# Patient Record
Sex: MALE | Race: WHITE | NOT HISPANIC OR LATINO | Employment: OTHER | URBAN - NONMETROPOLITAN AREA
[De-identification: names, ages, dates, MRNs, and addresses within clinical notes are randomized per-mention and may not be internally consistent; named-entity substitution may affect disease eponyms.]

---

## 2017-09-22 ENCOUNTER — OFFICE VISIT (OUTPATIENT)
Dept: UROLOGY | Facility: CLINIC | Age: 77
End: 2017-09-22

## 2017-09-22 VITALS
HEIGHT: 60 IN | WEIGHT: 206 LBS | HEART RATE: 80 BPM | SYSTOLIC BLOOD PRESSURE: 141 MMHG | DIASTOLIC BLOOD PRESSURE: 84 MMHG | BODY MASS INDEX: 40.44 KG/M2

## 2017-09-22 DIAGNOSIS — R32 INCONTINENCE: Primary | ICD-10-CM

## 2017-09-22 DIAGNOSIS — N40.0 BENIGN NON-NODULAR PROSTATIC HYPERPLASIA, PRESENCE OF LOWER URINARY TRACT SYMPTOMS UNSPECIFIED: ICD-10-CM

## 2017-09-22 LAB
BILIRUB BLD-MCNC: NEGATIVE MG/DL
CLARITY, POC: CLEAR
COLOR UR: YELLOW
GLUCOSE UR STRIP-MCNC: NEGATIVE MG/DL
KETONES UR QL: ABNORMAL
LEUKOCYTE EST, POC: NEGATIVE
NITRITE UR-MCNC: NEGATIVE MG/ML
PH UR: 5 [PH] (ref 5–8)
PROT UR STRIP-MCNC: ABNORMAL MG/DL
PSA SERPL-MCNC: 0.67 NG/ML (ref 0–4)
RBC # UR STRIP: NEGATIVE /UL
SP GR UR: 1.02 (ref 1–1.03)
UROBILINOGEN UR QL: NORMAL

## 2017-09-22 PROCEDURE — 51798 US URINE CAPACITY MEASURE: CPT | Performed by: NURSE PRACTITIONER

## 2017-09-22 PROCEDURE — 84153 ASSAY OF PSA TOTAL: CPT | Performed by: NURSE PRACTITIONER

## 2017-09-22 PROCEDURE — 36415 COLL VENOUS BLD VENIPUNCTURE: CPT | Performed by: NURSE PRACTITIONER

## 2017-09-22 PROCEDURE — 99203 OFFICE O/P NEW LOW 30 MIN: CPT | Performed by: NURSE PRACTITIONER

## 2017-09-22 PROCEDURE — 81003 URINALYSIS AUTO W/O SCOPE: CPT | Performed by: NURSE PRACTITIONER

## 2017-09-22 RX ORDER — LORATADINE 10 MG/1
10 TABLET, ORALLY DISINTEGRATING ORAL DAILY
COMMUNITY

## 2017-09-22 RX ORDER — ATORVASTATIN CALCIUM 20 MG/1
TABLET, FILM COATED ORAL
COMMUNITY
Start: 2017-09-08

## 2017-09-22 RX ORDER — TRAZODONE HYDROCHLORIDE 100 MG/1
100 TABLET ORAL NIGHTLY
COMMUNITY

## 2017-09-22 RX ORDER — MELOXICAM 7.5 MG/1
TABLET ORAL
COMMUNITY
Start: 2017-09-15

## 2017-09-22 RX ORDER — PANTOPRAZOLE SODIUM 40 MG/1
TABLET, DELAYED RELEASE ORAL
COMMUNITY
Start: 2017-09-18

## 2017-09-22 RX ORDER — MEGESTROL ACETATE 40 MG/ML
SUSPENSION ORAL
COMMUNITY
Start: 2017-07-03

## 2017-09-22 RX ORDER — ACETAMINOPHEN 500 MG
500 TABLET ORAL EVERY 6 HOURS PRN
COMMUNITY

## 2017-09-22 RX ORDER — FERROUS SULFATE 325(65) MG
325 TABLET ORAL
COMMUNITY

## 2017-09-22 RX ORDER — MIRTAZAPINE 15 MG/1
TABLET, FILM COATED ORAL
COMMUNITY
Start: 2017-09-13

## 2017-09-22 RX ORDER — ESCITALOPRAM OXALATE 20 MG/1
TABLET ORAL
COMMUNITY
Start: 2017-09-05

## 2017-09-22 RX ORDER — CLONIDINE HYDROCHLORIDE 0.1 MG/1
0.1 TABLET ORAL 2 TIMES DAILY
COMMUNITY

## 2017-09-22 RX ORDER — TAMSULOSIN HYDROCHLORIDE 0.4 MG/1
CAPSULE ORAL
COMMUNITY
Start: 2017-09-10 | End: 2018-03-29 | Stop reason: SDUPTHER

## 2017-09-22 RX ORDER — BISACODYL 5 MG/1
5 TABLET, DELAYED RELEASE ORAL DAILY PRN
COMMUNITY

## 2017-09-22 RX ORDER — FINASTERIDE 5 MG/1
TABLET, FILM COATED ORAL
COMMUNITY
Start: 2017-08-28 | End: 2018-03-29 | Stop reason: SDUPTHER

## 2017-09-22 RX ORDER — POLYETHYLENE GLYCOL 3350 17 G/17G
POWDER, FOR SOLUTION ORAL
COMMUNITY
Start: 2017-08-28

## 2017-09-22 RX ORDER — HYDROCODONE BITARTRATE AND ACETAMINOPHEN 7.5; 325 MG/1; MG/1
TABLET ORAL
COMMUNITY
Start: 2017-09-06

## 2017-09-22 RX ORDER — FENTANYL 75 UG/H
PATCH TRANSDERMAL
COMMUNITY
Start: 2017-08-28

## 2017-09-22 RX ORDER — MEMANTINE HYDROCHLORIDE AND DONEPEZIL HYDROCHLORIDE 28; 10 MG/1; MG/1
CAPSULE ORAL
COMMUNITY
Start: 2017-09-12

## 2017-09-22 RX ORDER — LEVOTHYROXINE SODIUM 0.07 MG/1
TABLET ORAL
COMMUNITY
Start: 2017-09-21

## 2017-09-22 NOTE — PROGRESS NOTES
Chief Complaint:          Chief Complaint   Patient presents with   • Urinary Incontinence       HPI:   77 y.o. male being seen in the office today for history of urinary incontinence. Patient is resting of the local nursing home. He is a status post hip fracture with repair. His main urinary incontinence is during the night but he does use a urinal to urinate. Patient is able to stand and ambulate. His postvoid residual today is 30 mL. He is currently on Flomax and finasteride as maximum medical therapy for BPH. His urinalysis today reveals negative results.    HPI        Past Medical History:        Past Medical History:   Diagnosis Date   • Allergic rhinitis    • Alzheimer disease    • Anxiety    • BPH (benign prostatic hyperplasia)    • Broken hip    • Dementia    • Depression    • Diabetes mellitus    • Hyperlipemia    • Hypothyroid    • Insomnia    • Muscle weakness    • Osteoporosis          Current Meds:     Current Outpatient Prescriptions   Medication Sig Dispense Refill   • acetaminophen (TYLENOL) 500 MG tablet Take 500 mg by mouth Every 6 (Six) Hours As Needed for Mild Pain .     • atorvastatin (LIPITOR) 20 MG tablet      • bisacodyl (DULCOLAX) 5 MG EC tablet Take 5 mg by mouth Daily As Needed for Constipation.     • CloNIDine (CATAPRES) 0.1 MG tablet Take 0.1 mg by mouth 2 (Two) Times a Day.     • escitalopram (LEXAPRO) 20 MG tablet      • fentaNYL (DURAGESIC) 75 MCG/HR patch      • ferrous sulfate 325 (65 FE) MG tablet Take 325 mg by mouth Daily With Breakfast.     • finasteride (PROSCAR) 5 MG tablet      • HYDROcodone-acetaminophen (NORCO) 7.5-325 MG per tablet      • levothyroxine (SYNTHROID, LEVOTHROID) 75 MCG tablet      • loratadine (LORATADINE ALLERGY RELIEF) 10 MG disintegrating tablet Take 10 mg by mouth Daily.     • megestrol (MEGACE) 40 MG/ML suspension      • meloxicam (MOBIC) 7.5 MG tablet      • metFORMIN (GLUCOPHAGE) 1000 MG tablet      • mirtazapine (REMERON) 15 MG tablet      • NAMZARIC  28-10 MG capsule sustained-release 24 hr      • pantoprazole (PROTONIX) 40 MG EC tablet      • polyethylene glycol (MIRALAX) powder      • tamsulosin (FLOMAX) 0.4 MG capsule 24 hr capsule      • traZODone (DESYREL) 100 MG tablet Take 100 mg by mouth Every Night.       No current facility-administered medications for this visit.         Allergies:      No Known Allergies     Past Surgical History:     History reviewed. No pertinent surgical history.      Social History:     Social History     Social History   • Marital status: Unknown     Spouse name: N/A   • Number of children: N/A   • Years of education: N/A     Occupational History   • Not on file.     Social History Main Topics   • Smoking status: Former Smoker   • Smokeless tobacco: Never Used   • Alcohol use No   • Drug use: No   • Sexual activity: Not on file     Other Topics Concern   • Not on file     Social History Narrative   • No narrative on file       Family History:     History reviewed. No pertinent family history.    Review of Systems:     Review of Systems   Constitutional: Negative for chills, fatigue and fever.   Respiratory: Negative for cough, shortness of breath and wheezing.    Cardiovascular: Negative for leg swelling.   Gastrointestinal: Negative for abdominal pain and nausea.   Musculoskeletal: Positive for back pain. Negative for joint swelling.   Neurological: Negative for dizziness and headaches.   Psychiatric/Behavioral: Negative for confusion.       Physical Exam:     Physical Exam   Constitutional: He is oriented to person, place, and time. He appears well-developed and well-nourished. No distress.   Abdominal: Soft. Bowel sounds are normal. He exhibits no distension and no mass. There is no tenderness. There is no rebound and no guarding. No hernia.   Genitourinary: Rectum normal, prostate normal and penis normal.   Musculoskeletal: Normal range of motion.   Neurological: He is alert and oriented to person, place, and time.   Skin:  Skin is warm and dry. No rash noted. He is not diaphoretic. No pallor.   Psychiatric: He has a normal mood and affect. His behavior is normal. Judgment and thought content normal.   Nursing note and vitals reviewed.      Procedure:     No notes on file      Assessment:     Encounter Diagnoses   Name Primary?   • Incontinence Yes   • Benign non-nodular prostatic hyperplasia, presence of lower urinary tract symptoms unspecified      Orders Placed This Encounter   Procedures   • PSA   • Bladder Scan   • POC Urinalysis Dipstick, Automated       Plan:   PSA to be drawn today since he has not had one drawn in over one year. Recommend the patient be assisted with standing or going to the toilet to urinate to help facilitate emptying of the bladder or the use of a bedside commode. He is to continue the Flomax and Proscar as currently prescribed. Further recommend he start PT to help better his ambulation as this can also help with decrease in the urinary difficulties. He is returned to the office in 6 months for follow-up for a bladder scan.    Counseling was given to patient and caretaker for the following topics diagnostic results, patient and family education and impressions. and the interim medical history and current results were reviewed.  A treatment plan with follow-up was made. Total time of the encounter was 32 minutes and 32 minutes were spent discussing Incontinence [R32] face-to-face.       This document has been electronically signed by JORDAN Fisher September 22, 2017 6:58 PM

## 2018-03-29 ENCOUNTER — OFFICE VISIT (OUTPATIENT)
Dept: UROLOGY | Facility: CLINIC | Age: 78
End: 2018-03-29

## 2018-03-29 DIAGNOSIS — N40.1 BENIGN PROSTATIC HYPERPLASIA WITH URINARY OBSTRUCTION: ICD-10-CM

## 2018-03-29 DIAGNOSIS — N39.3 STRESS INCONTINENCE OF URINE: Primary | ICD-10-CM

## 2018-03-29 DIAGNOSIS — N13.8 BENIGN PROSTATIC HYPERPLASIA WITH URINARY OBSTRUCTION: ICD-10-CM

## 2018-03-29 PROBLEM — R32 URINARY INCONTINENCE: Status: ACTIVE | Noted: 2018-03-29

## 2018-03-29 PROCEDURE — 51798 US URINE CAPACITY MEASURE: CPT | Performed by: NURSE PRACTITIONER

## 2018-03-29 PROCEDURE — 99213 OFFICE O/P EST LOW 20 MIN: CPT | Performed by: NURSE PRACTITIONER

## 2018-03-29 RX ORDER — TAMSULOSIN HYDROCHLORIDE 0.4 MG/1
1 CAPSULE ORAL DAILY
Qty: 30 CAPSULE | Refills: 11 | Status: SHIPPED | OUTPATIENT
Start: 2018-03-29 | End: 2019-03-29 | Stop reason: SDUPTHER

## 2018-03-29 RX ORDER — FINASTERIDE 5 MG/1
5 TABLET, FILM COATED ORAL DAILY
Qty: 30 TABLET | Refills: 11 | Status: SHIPPED | OUTPATIENT
Start: 2018-03-29 | End: 2019-03-29 | Stop reason: SDUPTHER

## 2018-03-29 NOTE — PROGRESS NOTES
Chief Complaint:          Chief Complaint   Patient presents with   • Urinary Incontinence       HPI:   77 y.o. male resident of the [facility] being seen in the office today for history of urinary incontinence. Patient is currently on Flomax and Proscar as maximum medical therapy for BPH. His current PSA drawn on 09/20/2017 is 0.67 and his PVR today was 79 mL. Denies any discomfort with urination. Does use a urinal to urinate but is able to walk to the bathroom    HPI        Past Medical History:        Past Medical History:   Diagnosis Date   • Allergic rhinitis    • Alzheimer disease    • Anxiety    • BPH (benign prostatic hyperplasia)    • Broken hip    • Dementia    • Depression    • Diabetes mellitus    • Hyperlipemia    • Hypothyroid    • Insomnia    • Muscle weakness    • Osteoporosis          Current Meds:     Current Outpatient Prescriptions   Medication Sig Dispense Refill   • acetaminophen (TYLENOL) 500 MG tablet Take 500 mg by mouth Every 6 (Six) Hours As Needed for Mild Pain .     • atorvastatin (LIPITOR) 20 MG tablet      • bisacodyl (DULCOLAX) 5 MG EC tablet Take 5 mg by mouth Daily As Needed for Constipation.     • CloNIDine (CATAPRES) 0.1 MG tablet Take 0.1 mg by mouth 2 (Two) Times a Day.     • escitalopram (LEXAPRO) 20 MG tablet      • fentaNYL (DURAGESIC) 75 MCG/HR patch      • ferrous sulfate 325 (65 FE) MG tablet Take 325 mg by mouth Daily With Breakfast.     • finasteride (PROSCAR) 5 MG tablet Take 1 tablet by mouth Daily. 30 tablet 11   • HYDROcodone-acetaminophen (NORCO) 7.5-325 MG per tablet      • levothyroxine (SYNTHROID, LEVOTHROID) 75 MCG tablet      • loratadine (LORATADINE ALLERGY RELIEF) 10 MG disintegrating tablet Take 10 mg by mouth Daily.     • megestrol (MEGACE) 40 MG/ML suspension      • meloxicam (MOBIC) 7.5 MG tablet      • metFORMIN (GLUCOPHAGE) 1000 MG tablet      • mirtazapine (REMERON) 15 MG tablet      • NAMZARIC 28-10 MG capsule sustained-release 24 hr       • pantoprazole (PROTONIX) 40 MG EC tablet      • polyethylene glycol (MIRALAX) powder      • tamsulosin (FLOMAX) 0.4 MG capsule 24 hr capsule Take 1 capsule by mouth Daily. 30 capsule 11   • traZODone (DESYREL) 100 MG tablet Take 100 mg by mouth Every Night.       No current facility-administered medications for this visit.         Allergies:      No Known Allergies     Past Surgical History:     No past surgical history on file.      Social History:     Social History     Social History   • Marital status: Unknown     Spouse name: N/A   • Number of children: N/A   • Years of education: N/A     Occupational History   • Not on file.     Social History Main Topics   • Smoking status: Former Smoker   • Smokeless tobacco: Never Used   • Alcohol use No   • Drug use: No   • Sexual activity: Not on file     Other Topics Concern   • Not on file     Social History Narrative   • No narrative on file       Family History:     No family history on file.    Review of Systems:     Review of Systems   Constitutional: Negative for activity change.   HENT: Negative.    Respiratory: Negative.    Gastrointestinal: Negative.    Genitourinary: Positive for frequency.        Incomplete bladder emptying   Musculoskeletal: Positive for back pain.   Neurological: Negative.    Psychiatric/Behavioral: Negative.          I have reviewed the following portions of the patient's history: allergies, current medications, past family history, past medical history, past social history, past surgical history, problem list and ROS and confirm it's accurate.  Physical Exam:     Physical Exam   Constitutional: He is oriented to person, place, and time. He appears well-developed and well-nourished. No distress.   Abdominal: Soft. Bowel sounds are normal. He exhibits no distension and no mass. There is no tenderness. There is no rebound and no guarding. No hernia.   Musculoskeletal: Normal range of motion.   Neurological: He is alert and oriented to  person, place, and time.   Skin: Skin is warm and dry. No rash noted. He is not diaphoretic. No pallor.   Psychiatric: He has a normal mood and affect. His behavior is normal. Judgment and thought content normal.   Nursing note and vitals reviewed.      Procedure:     No notes on file      Assessment:     Encounter Diagnoses   Name Primary?   • Stress incontinence of urine Yes   • Benign prostatic hyperplasia with urinary obstruction      Orders Placed This Encounter   Procedures   • Bladder Scan       Plan:   Since the patient is doing well on Flomax and Proscar and his PVR being only 79 mL will recommend we continue the medication as currently prescribed. He is to return to the office in one year for medical management with a PSA prior to his appointment at which time a MARIE and a PVR will be completed.      Counseling was given to patient and caretaker for the following topics patient and family education including: As discussed in the plan above. The interim medical history and current results were reviewed.  A treatment plan with follow-up was made for Stress incontinence of urine [N39.3]. I spent 20 minutes face to face with Khoi Lemon and 12 was spent in counseling.     This document has been electronically signed by JORDAN Fisher March 29, 2018 4:37 PM

## 2018-11-08 ENCOUNTER — OFFICE VISIT (OUTPATIENT)
Dept: UROLOGY | Facility: CLINIC | Age: 78
End: 2018-11-08

## 2018-11-08 VITALS — WEIGHT: 206 LBS | BODY MASS INDEX: 40.44 KG/M2 | HEIGHT: 60 IN

## 2018-11-08 DIAGNOSIS — N39.41 URGENCY INCONTINENCE: ICD-10-CM

## 2018-11-08 DIAGNOSIS — N31.9 NEUROGENIC BLADDER: ICD-10-CM

## 2018-11-08 DIAGNOSIS — R33.9 INCOMPLETE EMPTYING OF BLADDER: Primary | ICD-10-CM

## 2018-11-08 PROCEDURE — 99213 OFFICE O/P EST LOW 20 MIN: CPT | Performed by: UROLOGY

## 2018-11-08 PROCEDURE — 51798 US URINE CAPACITY MEASURE: CPT | Performed by: UROLOGY

## 2018-11-08 NOTE — PROGRESS NOTES
Chief Complaint:          Incontinence and dementia    HPI:   78 y.o. male.  Pt can ambulate and go to bathroom but he often just wets upon himself.  HPI      Past Medical History:        Past Medical History:   Diagnosis Date   • Allergic rhinitis    • Alzheimer disease    • Anxiety    • BPH (benign prostatic hyperplasia)    • Broken hip (CMS/HCC)    • Dementia    • Depression    • Diabetes mellitus (CMS/HCC)    • Hyperlipemia    • Hypothyroid    • Insomnia    • Muscle weakness    • Osteoporosis          Current Meds:     Current Outpatient Prescriptions   Medication Sig Dispense Refill   • acetaminophen (TYLENOL) 500 MG tablet Take 500 mg by mouth Every 6 (Six) Hours As Needed for Mild Pain .     • atorvastatin (LIPITOR) 20 MG tablet      • fentaNYL (DURAGESIC) 75 MCG/HR patch      • finasteride (PROSCAR) 5 MG tablet Take 1 tablet by mouth Daily. 30 tablet 11   • loratadine (LORATADINE ALLERGY RELIEF) 10 MG disintegrating tablet Take 10 mg by mouth Daily.     • bisacodyl (DULCOLAX) 5 MG EC tablet Take 5 mg by mouth Daily As Needed for Constipation.     • CloNIDine (CATAPRES) 0.1 MG tablet Take 0.1 mg by mouth 2 (Two) Times a Day.     • escitalopram (LEXAPRO) 20 MG tablet      • ferrous sulfate 325 (65 FE) MG tablet Take 325 mg by mouth Daily With Breakfast.     • HYDROcodone-acetaminophen (NORCO) 7.5-325 MG per tablet      • levothyroxine (SYNTHROID, LEVOTHROID) 75 MCG tablet      • megestrol (MEGACE) 40 MG/ML suspension      • meloxicam (MOBIC) 7.5 MG tablet      • metFORMIN (GLUCOPHAGE) 1000 MG tablet      • mirtazapine (REMERON) 15 MG tablet      • NAMZARIC 28-10 MG capsule sustained-release 24 hr      • pantoprazole (PROTONIX) 40 MG EC tablet      • polyethylene glycol (MIRALAX) powder      • tamsulosin (FLOMAX) 0.4 MG capsule 24 hr capsule Take 1 capsule by mouth Daily. 30 capsule 11   • traZODone (DESYREL) 100 MG tablet Take 100 mg by mouth Every Night.       No current facility-administered medications for  this visit.         Allergies:      No Known Allergies     Past Surgical History:     History reviewed. No pertinent surgical history.      Social History:     Social History     Social History   • Marital status: Unknown     Spouse name: N/A   • Number of children: N/A   • Years of education: N/A     Occupational History   • Not on file.     Social History Main Topics   • Smoking status: Former Smoker   • Smokeless tobacco: Never Used   • Alcohol use No   • Drug use: No   • Sexual activity: Not on file     Other Topics Concern   • Not on file     Social History Narrative   • No narrative on file       Family History:     History reviewed. No pertinent family history.    Review of Systems:     Review of Systems   Constitutional: Positive for fatigue.   HENT: Negative for sinus pain and sinus pressure.    Eyes: Negative for pain and redness.   Respiratory: Negative for chest tightness.    Cardiovascular: Negative for chest pain.   Gastrointestinal: Positive for abdominal pain. Negative for constipation and diarrhea.   Endocrine: Negative for heat intolerance.   Genitourinary: Positive for frequency.   Musculoskeletal: Positive for back pain.   Skin: Negative for rash.   Allergic/Immunologic: Negative for food allergies.   Neurological: Negative for headaches.   Hematological: Does not bruise/bleed easily.   Psychiatric/Behavioral: The patient is not nervous/anxious.            I have reviewed the follow portions of the patient's history and confirmed they are accurate today:  allergies, current medications, past family history, past medical history, past social history, past surgical history, problem list and ROS  Physical Exam:     Physical Exam   Constitutional: He is oriented to person, place, and time.   HENT:   Head: Normocephalic and atraumatic.   Right Ear: External ear normal.   Left Ear: External ear normal.   Nose: Nose normal.   Mouth/Throat: Oropharynx is clear and moist.   Eyes: Pupils are equal, round,  "and reactive to light. Conjunctivae and EOM are normal.   Neck: Normal range of motion. Neck supple. No thyromegaly present.   Cardiovascular: Normal rate, regular rhythm, normal heart sounds and intact distal pulses.    No murmur heard.  Pulmonary/Chest: Effort normal and breath sounds normal. No respiratory distress. He has no wheezes. He has no rales. He exhibits no tenderness.   Abdominal: Soft. Bowel sounds are normal. He exhibits no distension and no mass. There is no tenderness. No hernia.   Genitourinary: Rectum normal, prostate normal and penis normal.   Genitourinary Comments: No infected hydrocele   Musculoskeletal: Normal range of motion. He exhibits no edema or tenderness.   Lymphadenopathy:     He has no cervical adenopathy.   Neurological: He is alert and oriented to person, place, and time. No cranial nerve deficit. He exhibits normal muscle tone. Coordination normal.   Skin: Skin is warm. No rash noted.   Psychiatric: He has a normal mood and affect. His behavior is normal. Judgment and thought content normal.   Nursing note and vitals reviewed.      Ht (!) 15.2 cm (6\")   Wt 93.4 kg (206 lb)   BMI 4023.16 kg/m²    Procedure:         Assessment:     Encounter Diagnoses   Name Primary?   • Incomplete emptying of bladder Yes   • Urgency incontinence    • Neurogenic bladder      Orders Placed This Encounter   Procedures   • Bladder Scan       Plan:   Will start pt on myrbetriq and time voids q 2 hours    Patient's Body mass index is 4,023.16 kg/m². BMI is above normal parameters. Recommendations include: with dementia pt is unlikely to comply.      Counseling was given to patient and caretaker for the following topics instructions for management as follows: incontinence. The interim medical history and current results were reviewed.  A treatment plan with follow-up was made for Incomplete emptying of bladder [R33.9].   This document has been electronically signed by Flo Nelson MD November 8, " 2018 4:21 PM

## 2019-03-29 ENCOUNTER — OFFICE VISIT (OUTPATIENT)
Dept: UROLOGY | Facility: CLINIC | Age: 79
End: 2019-03-29

## 2019-03-29 VITALS — WEIGHT: 206 LBS | BODY MASS INDEX: 27.9 KG/M2 | HEIGHT: 72 IN

## 2019-03-29 DIAGNOSIS — N39.3 STRESS INCONTINENCE OF URINE: ICD-10-CM

## 2019-03-29 DIAGNOSIS — R35.0 FREQUENCY OF URINATION: Primary | ICD-10-CM

## 2019-03-29 DIAGNOSIS — N13.8 BENIGN PROSTATIC HYPERPLASIA WITH URINARY OBSTRUCTION: ICD-10-CM

## 2019-03-29 DIAGNOSIS — N40.1 BENIGN PROSTATIC HYPERPLASIA WITH URINARY OBSTRUCTION: ICD-10-CM

## 2019-03-29 DIAGNOSIS — N39.41 URGENCY INCONTINENCE: ICD-10-CM

## 2019-03-29 LAB
BILIRUB BLD-MCNC: NEGATIVE MG/DL
CLARITY, POC: CLEAR
COLOR UR: YELLOW
GLUCOSE UR STRIP-MCNC: NEGATIVE MG/DL
KETONES UR QL: NEGATIVE
LEUKOCYTE EST, POC: NEGATIVE
NITRITE UR-MCNC: NEGATIVE MG/ML
PH UR: 6.5 [PH] (ref 5–8)
PROT UR STRIP-MCNC: ABNORMAL MG/DL
RBC # UR STRIP: NEGATIVE /UL
SP GR UR: 1.02 (ref 1–1.03)
UROBILINOGEN UR QL: NORMAL

## 2019-03-29 PROCEDURE — 99213 OFFICE O/P EST LOW 20 MIN: CPT | Performed by: UROLOGY

## 2019-03-29 PROCEDURE — 81002 URINALYSIS NONAUTO W/O SCOPE: CPT | Performed by: UROLOGY

## 2019-03-29 RX ORDER — FINASTERIDE 5 MG/1
5 TABLET, FILM COATED ORAL DAILY
Qty: 30 TABLET | Refills: 11 | Status: SHIPPED | OUTPATIENT
Start: 2019-03-29

## 2019-03-29 RX ORDER — TAMSULOSIN HYDROCHLORIDE 0.4 MG/1
1 CAPSULE ORAL DAILY
Qty: 30 CAPSULE | Refills: 11 | Status: SHIPPED | OUTPATIENT
Start: 2019-03-29

## 2019-03-29 NOTE — PROGRESS NOTES
Chief Complaint:          incontinence    HPI:   78 y.o. male.  Pt is not complaining of urinary issues.  He complained about his legs back and stomack  HPI      Past Medical History:        Past Medical History:   Diagnosis Date   • Allergic rhinitis    • Alzheimer disease    • Anxiety    • BPH (benign prostatic hyperplasia)    • Broken hip (CMS/HCC)    • Dementia    • Depression    • Diabetes mellitus (CMS/HCC)    • Hyperlipemia    • Hypothyroid    • Insomnia    • Muscle weakness    • Osteoporosis          Current Meds:     Current Outpatient Medications   Medication Sig Dispense Refill   • acetaminophen (TYLENOL) 500 MG tablet Take 500 mg by mouth Every 6 (Six) Hours As Needed for Mild Pain .     • atorvastatin (LIPITOR) 20 MG tablet      • bisacodyl (DULCOLAX) 5 MG EC tablet Take 5 mg by mouth Daily As Needed for Constipation.     • CloNIDine (CATAPRES) 0.1 MG tablet Take 0.1 mg by mouth 2 (Two) Times a Day.     • escitalopram (LEXAPRO) 20 MG tablet      • fentaNYL (DURAGESIC) 75 MCG/HR patch      • ferrous sulfate 325 (65 FE) MG tablet Take 325 mg by mouth Daily With Breakfast.     • finasteride (PROSCAR) 5 MG tablet Take 1 tablet by mouth Daily. 30 tablet 11   • HYDROcodone-acetaminophen (NORCO) 7.5-325 MG per tablet      • levothyroxine (SYNTHROID, LEVOTHROID) 75 MCG tablet      • loratadine (LORATADINE ALLERGY RELIEF) 10 MG disintegrating tablet Take 10 mg by mouth Daily.     • megestrol (MEGACE) 40 MG/ML suspension      • meloxicam (MOBIC) 7.5 MG tablet      • metFORMIN (GLUCOPHAGE) 1000 MG tablet      • Mirabegron ER (MYRBETRIQ) 50 MG tablet sustained-release 24 hour 24 hr tablet Take 50 mg by mouth Daily. 30 tablet 11   • mirtazapine (REMERON) 15 MG tablet      • NAMZARIC 28-10 MG capsule sustained-release 24 hr      • pantoprazole (PROTONIX) 40 MG EC tablet      • polyethylene glycol (MIRALAX) powder      • tamsulosin (FLOMAX) 0.4 MG capsule 24 hr capsule Take 1 capsule by mouth Daily. 30 capsule 11   •  traZODone (DESYREL) 100 MG tablet Take 100 mg by mouth Every Night.       No current facility-administered medications for this visit.         Allergies:      No Known Allergies     Past Surgical History:     History reviewed. No pertinent surgical history.      Social History:     Social History     Socioeconomic History   • Marital status: Unknown     Spouse name: Not on file   • Number of children: Not on file   • Years of education: Not on file   • Highest education level: Not on file   Tobacco Use   • Smoking status: Former Smoker   • Smokeless tobacco: Never Used   Substance and Sexual Activity   • Alcohol use: No   • Drug use: No       Family History:     History reviewed. No pertinent family history.    Review of Systems:     Review of Systems   Constitutional: Negative for fatigue.   HENT: Negative for sinus pressure and sinus pain.    Eyes: Negative for pain and redness.   Respiratory: Negative for chest tightness.    Cardiovascular: Negative for chest pain.   Gastrointestinal: Negative for abdominal pain and nausea.   Genitourinary: Positive for frequency. Negative for difficulty urinating.   Musculoskeletal: Negative for back pain.   Skin: Negative for rash.   Allergic/Immunologic: Negative for food allergies.   Neurological: Negative for headaches.   Hematological: Does not bruise/bleed easily.   Psychiatric/Behavioral: The patient is not nervous/anxious.              I have reviewed the follow portions of the patient's history and confirmed they are accurate today:  allergies, current medications, past family history, past medical history, past social history, past surgical history, problem list and ROS  Physical Exam:     Physical Exam   Constitutional: He is oriented to person, place, and time.   HENT:   Head: Normocephalic and atraumatic.   Right Ear: External ear normal.   Left Ear: External ear normal.   Nose: Nose normal.   Mouth/Throat: Oropharynx is clear and moist.   Eyes: Conjunctivae and EOM  "are normal. Pupils are equal, round, and reactive to light.   Neck: Normal range of motion. Neck supple. No thyromegaly present.   Cardiovascular: Normal rate, regular rhythm, normal heart sounds and intact distal pulses.   No murmur heard.  Pulmonary/Chest: Effort normal and breath sounds normal. No respiratory distress. He has no wheezes. He has no rales. He exhibits no tenderness.   Abdominal: Soft. Bowel sounds are normal. He exhibits no distension and no mass. There is no tenderness. No hernia.   Genitourinary: Rectum normal, prostate normal and penis normal.   Musculoskeletal: Normal range of motion. He exhibits no edema or tenderness.   Lymphadenopathy:     He has no cervical adenopathy.   Neurological: He is alert and oriented to person, place, and time. No cranial nerve deficit. He exhibits normal muscle tone. Coordination normal.   Skin: Skin is warm. No rash noted.   Psychiatric: He has a normal mood and affect. His behavior is normal. Judgment and thought content normal.   Nursing note and vitals reviewed.      Ht 182.9 cm (72\")   Wt 93.4 kg (206 lb)   BMI 27.94 kg/m²    Procedure:         Assessment:     Encounter Diagnoses   Name Primary?   • Frequency of urination Yes   • Urgency incontinence    • Benign prostatic hyperplasia with urinary obstruction      Orders Placed This Encounter   Procedures   • POC Urinalysis Dipstick       Plan:   He should stay on myrbetriq and stay on flomax and finasteride. Return pry    Patient's Body mass index is 27.94 kg/m². BMI is within normal parameters. No follow-up required..      Counseling was given to patient and caretaker for the following topics instructions for management as follows: bph and incontinence. The interim medical history and current results were reviewed.  A treatment plan with follow-up was made for Frequency of urination [R35.0]. I spent 21 minutes face to face with Khoi Lemon and 80 percentage was spent in counseling.    This document has been " electronically signed by Flo Nelson MD March 29, 2019 1:46 PM

## 2019-09-03 ENCOUNTER — OFFICE VISIT (OUTPATIENT)
Dept: UROLOGY | Facility: CLINIC | Age: 79
End: 2019-09-03

## 2019-09-03 VITALS — WEIGHT: 185 LBS | BODY MASS INDEX: 30.82 KG/M2 | HEIGHT: 65 IN

## 2019-09-03 DIAGNOSIS — N20.0 RENAL CALCULUS: Primary | ICD-10-CM

## 2019-09-03 PROCEDURE — 99213 OFFICE O/P EST LOW 20 MIN: CPT | Performed by: UROLOGY

## 2019-09-03 NOTE — PROGRESS NOTES
Chief Complaint:          Chief Complaint   Patient presents with   • Nephrolithiasis     follow up        HPI:   79 y.o. male.  Nursing home patient who cannot give an adequate history is referred for evaluation of a stone seen in the CT scan.  He has a number of significant findings he had an ultrasound that was technically limited showing left nephrolithiasis but no hydronephrosis a CT scan of his lumbar spine that was showing significant degenerative joint disc changes as would be expected.  He has a history of dementia, he has had a TURP, he had dyslipidemia, depression, anemia, anxiety ventral hernia syncope and sinusitis.  He is also against the diagnosis of chronic schizophrenia he is on a number of medications I am going to initiate a work-up with an upper tract study consisting of stone CT and then the planning necessary to complete the treatment.  Renal calculus-we discussed the presence of the stone we discussed the various therapeutic options available including percutaneous nephrostolithotomy, lithotripsy.  We discussed the risks of lithotripsy including the passage of stones the development of a large string of stones in the distal ureter known as Steinstrasse.  In the 3% incidence of that we will need to proceed with a ureteroscopy for obstructing fragments.  Extremely rare incidence of renal hematoma.  And the significance of this.  We discussed the absolute relative indicators for intervention including the presence of sepsis, and pain we cannot control is the primary need for urgent intervention.  We discussed placement of a stent if indicated and the management of the stent as well.    Past Medical History:        Past Medical History:   Diagnosis Date   • Allergic rhinitis    • Alzheimer disease    • Anxiety    • BPH (benign prostatic hyperplasia)    • Broken hip (CMS/HCC)    • Dementia    • Depression    • Diabetes mellitus (CMS/HCC)    • Hyperlipemia    • Hypothyroid    • Insomnia    • Muscle  weakness    • Osteoporosis          Current Meds:     Current Outpatient Medications   Medication Sig Dispense Refill   • acetaminophen (TYLENOL) 500 MG tablet Take 500 mg by mouth Every 6 (Six) Hours As Needed for Mild Pain .     • atorvastatin (LIPITOR) 20 MG tablet      • bisacodyl (DULCOLAX) 5 MG EC tablet Take 5 mg by mouth Daily As Needed for Constipation.     • CloNIDine (CATAPRES) 0.1 MG tablet Take 0.1 mg by mouth 2 (Two) Times a Day.     • escitalopram (LEXAPRO) 20 MG tablet      • fentaNYL (DURAGESIC) 75 MCG/HR patch      • ferrous sulfate 325 (65 FE) MG tablet Take 325 mg by mouth Daily With Breakfast.     • finasteride (PROSCAR) 5 MG tablet Take 1 tablet by mouth Daily. 30 tablet 11   • HYDROcodone-acetaminophen (NORCO) 7.5-325 MG per tablet      • levothyroxine (SYNTHROID, LEVOTHROID) 75 MCG tablet      • loratadine (LORATADINE ALLERGY RELIEF) 10 MG disintegrating tablet Take 10 mg by mouth Daily.     • megestrol (MEGACE) 40 MG/ML suspension      • meloxicam (MOBIC) 7.5 MG tablet      • metFORMIN (GLUCOPHAGE) 1000 MG tablet      • Mirabegron ER (MYRBETRIQ) 50 MG tablet sustained-release 24 hour 24 hr tablet Take 50 mg by mouth Daily. 30 tablet 11   • mirtazapine (REMERON) 15 MG tablet      • NAMZARIC 28-10 MG capsule sustained-release 24 hr      • pantoprazole (PROTONIX) 40 MG EC tablet      • polyethylene glycol (MIRALAX) powder      • tamsulosin (FLOMAX) 0.4 MG capsule 24 hr capsule Take 1 capsule by mouth Daily. 30 capsule 11   • traZODone (DESYREL) 100 MG tablet Take 100 mg by mouth Every Night.       No current facility-administered medications for this visit.         Allergies:      No Known Allergies     Past Surgical History:     History reviewed. No pertinent surgical history.      Social History:     Social History     Socioeconomic History   • Marital status: Unknown     Spouse name: Not on file   • Number of children: Not on file   • Years of education: Not on file   • Highest education  level: Not on file   Tobacco Use   • Smoking status: Former Smoker   • Smokeless tobacco: Never Used   Substance and Sexual Activity   • Alcohol use: No   • Drug use: No       Family History:     History reviewed. No pertinent family history.    Review of Systems:     Review of Systems   Constitutional: Negative.    HENT: Positive for hearing loss.    Eyes: Negative.    Respiratory: Negative.    Cardiovascular: Negative.    Gastrointestinal: Negative.    Endocrine: Negative.    Musculoskeletal: Negative.    Allergic/Immunologic: Negative.    Neurological: Negative.    Hematological: Negative.    Psychiatric/Behavioral: Negative.        Physical Exam:     Physical Exam   Constitutional: He is oriented to person, place, and time. He appears well-developed and well-nourished.   HENT:   Head: Normocephalic and atraumatic.   Eyes: Conjunctivae and EOM are normal. Pupils are equal, round, and reactive to light.   Neck: Normal range of motion.   Cardiovascular: Normal rate, regular rhythm, normal heart sounds and intact distal pulses.   Pulmonary/Chest: Effort normal and breath sounds normal.   Abdominal: Soft. Bowel sounds are normal.   Genitourinary:   Genitourinary Comments: Extremely hard of hearing and demented   Musculoskeletal: Normal range of motion.   Neurological: He is alert and oriented to person, place, and time. He has normal reflexes.   Skin: Skin is warm and dry.   Psychiatric: He has a normal mood and affect. His behavior is normal. Judgment and thought content normal.   Nursing note and vitals reviewed.      I have reviewed the following portions of the patient's history: allergies, current medications, past family history, past medical history, past social history, past surgical history, problem list and ROS and confirm it's accurate.      Procedure:       Assessment/Plan:   Renal calculus-we discussed the presence of the stone we discussed the various therapeutic options available including percutaneous  nephrostolithotomy, lithotripsy.  We discussed the risks of lithotripsy including the passage of stones the development of a large string of stones in the distal ureter known as Steinstrasse.  In the 3% incidence of that we will need to proceed with a ureteroscopy for obstructing fragments.  Extremely rare incidence of renal hematoma.  And the significance of this.  We discussed the absolute relative indicators for intervention including the presence of sepsis, and pain we cannot control is the primary need for urgent intervention.  We discussed placement of a stent if indicated and the management of the stent as well.  I am to characterize the stone and renal collecting system with a stone CT and see him back to proceed with operative planning            Patient's There is no height or weight on file to calculate BMI. BMI is above normal parameters. Recommendations include: educational material.              This document has been electronically signed by DAYANARA MCKINNEY MD September 3, 2019 9:28 AM

## 2019-09-04 PROBLEM — N20.0 RENAL CALCULUS: Status: ACTIVE | Noted: 2019-09-04

## 2019-09-11 ENCOUNTER — HOSPITAL ENCOUNTER (OUTPATIENT)
Dept: CT IMAGING | Facility: HOSPITAL | Age: 79
Discharge: HOME | End: 2019-09-11
Admitting: UROLOGY

## 2019-09-11 DIAGNOSIS — N20.0 RENAL CALCULUS: ICD-10-CM

## 2019-09-11 PROCEDURE — 74176 CT ABD & PELVIS W/O CONTRAST: CPT | Performed by: RADIOLOGY

## 2019-09-11 PROCEDURE — 74176 CT ABD & PELVIS W/O CONTRAST: CPT

## 2023-06-05 ENCOUNTER — OFFICE VISIT (OUTPATIENT)
Dept: ENDOCRINOLOGY | Facility: CLINIC | Age: 83
End: 2023-06-05
Payer: MEDICARE

## 2023-06-05 VITALS — SYSTOLIC BLOOD PRESSURE: 118 MMHG | DIASTOLIC BLOOD PRESSURE: 68 MMHG | HEART RATE: 78 BPM | OXYGEN SATURATION: 94 %

## 2023-06-05 DIAGNOSIS — E83.52 HYPERCALCEMIA: Primary | ICD-10-CM

## 2023-06-05 DIAGNOSIS — E04.2 MULTIPLE THYROID NODULES: ICD-10-CM

## 2023-06-05 DIAGNOSIS — E11.65 TYPE 2 DIABETES MELLITUS WITH HYPERGLYCEMIA, UNSPECIFIED WHETHER LONG TERM INSULIN USE: ICD-10-CM

## 2023-06-05 LAB
ALBUMIN SERPL-MCNC: 3.8 G/DL (ref 3.5–5.2)
ALBUMIN/GLOB SERPL: 1.3 G/DL
ALP SERPL-CCNC: 81 U/L (ref 39–117)
ALT SERPL W P-5'-P-CCNC: 20 U/L (ref 1–41)
ANION GAP SERPL CALCULATED.3IONS-SCNC: 9.5 MMOL/L (ref 5–15)
AST SERPL-CCNC: 19 U/L (ref 1–40)
BILIRUB SERPL-MCNC: 0.2 MG/DL (ref 0–1.2)
BUN SERPL-MCNC: 28 MG/DL (ref 8–23)
BUN/CREAT SERPL: 20 (ref 7–25)
CALCIUM SPEC-SCNC: 12.4 MG/DL (ref 8.6–10.5)
CHLORIDE SERPL-SCNC: 109 MMOL/L (ref 98–107)
CO2 SERPL-SCNC: 22.5 MMOL/L (ref 22–29)
CREAT SERPL-MCNC: 1.4 MG/DL (ref 0.76–1.27)
EGFRCR SERPLBLD CKD-EPI 2021: 50.2 ML/MIN/1.73
EXPIRATION DATE: NORMAL
GLOBULIN UR ELPH-MCNC: 3 GM/DL
GLUCOSE BLDC GLUCOMTR-MCNC: 203 MG/DL (ref 70–130)
GLUCOSE SERPL-MCNC: 180 MG/DL (ref 65–99)
HBA1C MFR BLD: 5.8 %
Lab: NORMAL
POTASSIUM SERPL-SCNC: 4.3 MMOL/L (ref 3.5–5.2)
PROT SERPL-MCNC: 6.8 G/DL (ref 6–8.5)
PTH-INTACT SERPL-MCNC: 131 PG/ML (ref 15–65)
SODIUM SERPL-SCNC: 141 MMOL/L (ref 136–145)

## 2023-06-05 PROCEDURE — 83970 ASSAY OF PARATHORMONE: CPT | Performed by: NURSE PRACTITIONER

## 2023-06-05 PROCEDURE — 80053 COMPREHEN METABOLIC PANEL: CPT | Performed by: NURSE PRACTITIONER

## 2023-06-05 RX ORDER — OLANZAPINE 15 MG/1
15 TABLET ORAL NIGHTLY
COMMUNITY

## 2023-06-05 RX ORDER — MULTIPLE VITAMINS W/ MINERALS TAB 9MG-400MCG
1 TAB ORAL DAILY
COMMUNITY

## 2023-06-05 RX ORDER — BISACODYL 10 MG
10 SUPPOSITORY, RECTAL RECTAL AS NEEDED
COMMUNITY

## 2023-06-05 RX ORDER — ARTIFICIAL TEARS 1; 2; 3 MG/ML; MG/ML; MG/ML
1 SOLUTION/ DROPS OPHTHALMIC EVERY 12 HOURS PRN
COMMUNITY

## 2023-06-05 RX ORDER — LEVOTHYROXINE SODIUM 0.15 MG/1
150 TABLET ORAL DAILY
COMMUNITY

## 2023-06-05 RX ORDER — LISINOPRIL 10 MG/1
10 TABLET ORAL 2 TIMES DAILY
COMMUNITY

## 2023-06-05 RX ORDER — ALBUTEROL SULFATE 90 UG/1
2 AEROSOL, METERED RESPIRATORY (INHALATION) EVERY 6 HOURS PRN
COMMUNITY

## 2023-06-05 RX ORDER — INSULIN LISPRO 100 [IU]/ML
INJECTION, SOLUTION INTRAVENOUS; SUBCUTANEOUS 3 TIMES DAILY
COMMUNITY

## 2023-06-05 NOTE — PATIENT INSTRUCTIONS
Thyroid Nodules:  He needs a repeat US Thyroid done in the next month.  Our office with schedule that and send you that appointment and we will see him in 1 month to check for any changes.    Elevated Calcium:  He needs additional labs to check for a kidney related issue to this.  I will order these to be done there at the nursing home and you all can fax me the results when they are done.   This needs to be followed closely.

## 2023-06-05 NOTE — PROGRESS NOTES
Chief Complaint   Patient presents with    Diabetes    thyroid nodule        Referring Provider  No ref. provider found     HPI   Khoi Lemon is a 82 y.o. male had concerns including Diabetes and thyroid nodule.   Seen as a new patient.  Hyperparathyroidism, Thyroid Nodules.    Patient sent from [facility] with aid for evaluation of his newly noted thyroid nodules on US.  Patient was originally getting this US performed d/t hypercalcemia and elevated PTH levels.   Labs:  01/09/2023:  CA: 13.2 H  01/10/2023  Ca: 12.9 H  PTH: 253 H    03/14/2023  CA+: 11.5    He had an US Thyroid/Soft Tissue Neck performed d/t hyperparathyroidism, on 1/2023 which noted a 1.2 cm heterogeneous hypoechoic focus in the lower pole demonstrating increased vascularity in the right lobe and a 1.0 cm heterogeneous hypoechoic focus in the mid to lateral pole demonstrating increased vascularity in the left lobe.      Patient and caregiver unable to give thyroid history.    Past Medical History:   Diagnosis Date    Allergic rhinitis     Alzheimer disease     Anxiety     BPH (benign prostatic hyperplasia)     Broken hip     Dementia     Depression     Diabetes mellitus     Hyperlipemia     Hypothyroid     Insomnia     Muscle weakness     Osteoporosis      No past surgical history on file.   No family history on file.   Social History     Socioeconomic History    Marital status: Single   Tobacco Use    Smoking status: Former    Smokeless tobacco: Never   Substance and Sexual Activity    Alcohol use: No    Drug use: No      No Known Allergies   Current Outpatient Medications on File Prior to Visit   Medication Sig Dispense Refill    acetaminophen (TYLENOL) 500 MG tablet Take 1 tablet by mouth Every 6 (Six) Hours As Needed for Mild Pain.      albuterol sulfate  (90 Base) MCG/ACT inhaler Inhale 2 puffs Every 6 (Six) Hours As Needed for Wheezing.      Artificial Tears ophthalmic solution Administer 1 drop to both eyes Every 12  (Twelve) Hours As Needed.      atorvastatin (LIPITOR) 20 MG tablet       bisacodyl (DULCOLAX) 10 MG suppository Insert 1 suppository into the rectum As Needed for Constipation.      bisacodyl (DULCOLAX) 5 MG EC tablet Take 1 tablet by mouth Daily As Needed for Constipation.      Calcium Carbonate-Vitamin D (CALCIUM CARBONATE W/VITAMIN D PO) Take  by mouth Daily. 500-10 mg/mcg      CloNIDine (CATAPRES) 0.1 MG tablet Take 1 tablet by mouth Every 8 (Eight) Hours.      escitalopram (LEXAPRO) 20 MG tablet       fentaNYL (DURAGESIC) 75 MCG/HR patch Place 1 patch on the skin as directed by provider Every 72 (Seventy-Two) Hours.      ferrous sulfate 325 (65 FE) MG tablet Take 1 tablet by mouth 2 (Two) Times a Day.      finasteride (PROSCAR) 5 MG tablet Take 1 tablet by mouth Daily. 30 tablet 11    glucagon (GLUCAGEN) 1 MG injection Inject 1 mg under the skin into the appropriate area as directed 1 (One) Time As Needed for Low Blood Sugar.      HYDROcodone-acetaminophen (NORCO) 7.5-325 MG per tablet Take 1 tablet by mouth Every 6 (Six) Hours As Needed.      Insulin Lispro (HumaLOG) 100 UNIT/ML injection Inject  under the skin into the appropriate area as directed 3 (Three) Times a Day. Per sliding scale      levothyroxine (SYNTHROID, LEVOTHROID) 150 MCG tablet Take 1 tablet by mouth Daily.      lisinopril (PRINIVIL,ZESTRIL) 10 MG tablet Take 1 tablet by mouth 2 (Two) Times a Day.      loratadine (CLARITIN REDITABS) 10 MG disintegrating tablet Take 1 tablet by mouth Daily.      Magnesium Hydroxide (MILK OF MAGNESIA PO) Take 30 mL by mouth Daily As Needed.      Melatonin 3 MG capsule Take 1 capsule by mouth Daily.      meloxicam (MOBIC) 7.5 MG tablet Take 1 tablet by mouth 2 (Two) Times a Day.      metFORMIN (GLUCOPHAGE) 500 MG tablet Take 1 tablet by mouth 2 (Two) Times a Day With Meals.      Mirabegron ER (MYRBETRIQ) 50 MG tablet sustained-release 24 hour 24 hr tablet Take 50 mg by mouth Daily. 30 tablet 11    multivitamin  with minerals (MULTIVITAMIN ADULT PO) Take 1 tablet by mouth Daily.      NAMZARIC 28-10 MG capsule sustained-release 24 hr       OLANZapine (zyPREXA) 15 MG tablet Take 1 tablet by mouth Every Night.      pantoprazole (PROTONIX) 40 MG EC tablet Take 1 tablet by mouth Daily.      polyethylene glycol (MIRALAX) powder Take 17 g by mouth Daily.      SENNOSIDES PO Take 8.6 mg by mouth Daily As Needed.      Sodium Phosphates (ENEMA RE) Insert  into the rectum As Needed.      tamsulosin (FLOMAX) 0.4 MG capsule 24 hr capsule Take 1 capsule by mouth Daily. 30 capsule 11    traZODone (DESYREL) 50 MG tablet Take 1 tablet by mouth Every Night.      megestrol (MEGACE) 40 MG/ML suspension Not on med list from nursing home      mirtazapine (REMERON) 15 MG tablet Not on med list from nursing home      [DISCONTINUED] levothyroxine (SYNTHROID, LEVOTHROID) 75 MCG tablet        No current facility-administered medications on file prior to visit.        The following portions of the patient's history were reviewed and updated as appropriate: allergies, current medications, past family history, past medical history, past social history, past surgical history, and problem list.    Review of Systems   Reason unable to perform ROS: unable to obtain d/t cognitive.        /68 (BP Location: Left arm, Patient Position: Sitting, Cuff Size: Adult)   Pulse 78   SpO2 94%      Physical Exam  Vitals reviewed.   Constitutional:       Appearance: Normal appearance.   Neck:      Thyroid: No thyroid mass, thyromegaly or thyroid tenderness.   Cardiovascular:      Rate and Rhythm: Normal rate.   Pulmonary:      Effort: Pulmonary effort is normal.   Skin:     General: Skin is warm.   Neurological:      Mental Status: He is alert. He is disoriented.       Labs/Imaging  CMP  No results found for: GLUCOSE, BUN, CREATININE, EGFRIFNONA, EGFRIFAFRI, BCR, K, CO2, CALCIUM, PROTENTOTREF, ALBUMIN, LABIL2, BILIRUBIN, AST, ALT     CBC w/DIFF No results found  for: WBC, RBC, HGB, HCT, MCV, MCH, MCHC, RDW, RDWSD, MPV, PLT, NEUTRORELPCT, LYMPHORELPCT, MONORELPCT, EOSRELPCT, BASORELPCT, AUTOIGPER, NEUTROABS, LYMPHSABS, MONOSABS, EOSABS, BASOSABS, AUTOIGNUM, NRBC    TSH  No results found for: TSH    T4  No results found for: FREET4  No results found for: R2UFBEZ    T3  No results found for: T3FREE  No results found for: P2JJRZS    TRAb  No results found for: TSURCPAB    TPO  No results found for: THYROIDAB    No valid procedures specified.    Assessment and Plan    Diagnoses and all orders for this visit:    1. Hypercalcemia (Primary)  Assessment & Plan:  Patient has elevated calcium and PTH levels.  Indicative of primary hyperparathyroidism.  Patient's calcium was critical on last labs.  Will obtain updated stat labs to determine if patient needs acute vs outpatient management at this time.  Follow-up at next visit.     Orders:  -     Comprehensive Metabolic Panel  -     PTH, Intact  -     Calcium, Urine, 24 Hour - Urine, Clean Catch; Future  -     Calcium, Ionized; Future  -     Comprehensive Metabolic Panel; Future  -     PTH, Intact; Future  -     Vitamin D 25 Hydroxy; Future  -     Vitamin D 1,25 Dihydroxy; Future    2. Type 2 diabetes mellitus with hyperglycemia, unspecified whether long term insulin use  Assessment & Plan:  Patients A1c at goal.  Continue with current therapy at the nursing home.    Orders:  -     POC Glucose Fingerstick  -     POC Glycosylated Hemoglobin (Hb A1C)    3. Multiple thyroid nodules  Assessment & Plan:  Both nodules are not of size of FNA at this time but need close monitoring for growth.  Will obtain US 6 months post original and follow and manage based on size.    Orders:  -     US Thyroid         Return in about 4 weeks (around 7/3/2023) for Follow-up appointment. The patient was instructed to contact the clinic with any interval questions or concerns.      This document has been electronically signed by JORDAN Monson  June 5,  2023 14:00 EDT   Endocrinology

## 2023-06-05 NOTE — ASSESSMENT & PLAN NOTE
Both nodules are not of size of FNA at this time but need close monitoring for growth.  Will obtain US 6 months post original and follow and manage based on size.

## 2023-06-05 NOTE — ASSESSMENT & PLAN NOTE
Patient has elevated calcium and PTH levels.  Indicative of primary hyperparathyroidism.  Patient's calcium was critical on last labs.  Will obtain updated stat labs to determine if patient needs acute vs outpatient management at this time.  Follow-up at next visit.

## 2023-06-06 DIAGNOSIS — E21.0 PRIMARY HYPERPARATHYROIDISM: Primary | ICD-10-CM
